# Patient Record
Sex: MALE | Race: OTHER | Employment: FULL TIME | URBAN - METROPOLITAN AREA
[De-identification: names, ages, dates, MRNs, and addresses within clinical notes are randomized per-mention and may not be internally consistent; named-entity substitution may affect disease eponyms.]

---

## 2024-03-15 ENCOUNTER — HOSPITAL ENCOUNTER (EMERGENCY)
Age: 34
Discharge: HOME | End: 2024-03-15
Attending: STUDENT IN AN ORGANIZED HEALTH CARE EDUCATION/TRAINING PROGRAM
Payer: OTHER GOVERNMENT

## 2024-03-15 ENCOUNTER — APPOINTMENT (OUTPATIENT)
Dept: GENERAL RADIOLOGY | Age: 34
End: 2024-03-15
Payer: OTHER GOVERNMENT

## 2024-03-15 VITALS
HEART RATE: 89 BPM | SYSTOLIC BLOOD PRESSURE: 119 MMHG | DIASTOLIC BLOOD PRESSURE: 87 MMHG | WEIGHT: 175 LBS | HEIGHT: 65 IN | OXYGEN SATURATION: 96 % | TEMPERATURE: 98.2 F | RESPIRATION RATE: 16 BRPM | BODY MASS INDEX: 29.16 KG/M2

## 2024-03-15 DIAGNOSIS — J40 BRONCHITIS: Primary | ICD-10-CM

## 2024-03-15 DIAGNOSIS — R09.81 NASAL CONGESTION: ICD-10-CM

## 2024-03-15 LAB
INFLUENZA A BY PCR: NOT DETECTED
INFLUENZA B BY PCR: NOT DETECTED
RSV BY PCR: NOT DETECTED
SARS-COV-2 RDRP RESP QL NAA+PROBE: NOT DETECTED
SPECIMEN DESCRIPTION: NORMAL
SPECIMEN SOURCE: NORMAL

## 2024-03-15 PROCEDURE — 87634 RSV DNA/RNA AMP PROBE: CPT

## 2024-03-15 PROCEDURE — 6370000000 HC RX 637 (ALT 250 FOR IP): Performed by: STUDENT IN AN ORGANIZED HEALTH CARE EDUCATION/TRAINING PROGRAM

## 2024-03-15 PROCEDURE — 87635 SARS-COV-2 COVID-19 AMP PRB: CPT

## 2024-03-15 PROCEDURE — 87502 INFLUENZA DNA AMP PROBE: CPT

## 2024-03-15 PROCEDURE — 99284 EMERGENCY DEPT VISIT MOD MDM: CPT

## 2024-03-15 PROCEDURE — 94640 AIRWAY INHALATION TREATMENT: CPT

## 2024-03-15 PROCEDURE — 6370000000 HC RX 637 (ALT 250 FOR IP)

## 2024-03-15 PROCEDURE — 71046 X-RAY EXAM CHEST 2 VIEWS: CPT

## 2024-03-15 RX ORDER — METHYLPREDNISOLONE SODIUM SUCCINATE 125 MG/2ML
125 INJECTION, POWDER, LYOPHILIZED, FOR SOLUTION INTRAMUSCULAR; INTRAVENOUS ONCE
Status: DISCONTINUED | OUTPATIENT
Start: 2024-03-15 | End: 2024-03-15

## 2024-03-15 RX ORDER — GUAIFENESIN 600 MG/1
600 TABLET, EXTENDED RELEASE ORAL 2 TIMES DAILY
Qty: 30 TABLET | Refills: 0 | Status: SHIPPED | OUTPATIENT
Start: 2024-03-15 | End: 2024-03-30

## 2024-03-15 RX ORDER — CYCLOBENZAPRINE HCL 10 MG
10 TABLET ORAL 3 TIMES DAILY PRN
Qty: 21 TABLET | Refills: 0 | Status: SHIPPED | OUTPATIENT
Start: 2024-03-15 | End: 2024-03-25

## 2024-03-15 RX ORDER — PSEUDOEPHEDRINE HCL 30 MG
30 TABLET ORAL ONCE
Status: COMPLETED | OUTPATIENT
Start: 2024-03-15 | End: 2024-03-15

## 2024-03-15 RX ORDER — FLUTICASONE PROPIONATE 50 MCG
2 SPRAY, SUSPENSION (ML) NASAL DAILY
Qty: 16 G | Refills: 0 | Status: SHIPPED | OUTPATIENT
Start: 2024-03-15

## 2024-03-15 RX ORDER — ACETAMINOPHEN 325 MG/1
650 TABLET ORAL ONCE
Status: COMPLETED | OUTPATIENT
Start: 2024-03-15 | End: 2024-03-15

## 2024-03-15 RX ORDER — ACETAMINOPHEN 325 MG/1
TABLET ORAL
Status: COMPLETED
Start: 2024-03-15 | End: 2024-03-15

## 2024-03-15 RX ORDER — IPRATROPIUM BROMIDE AND ALBUTEROL SULFATE 2.5; .5 MG/3ML; MG/3ML
2 SOLUTION RESPIRATORY (INHALATION) ONCE
Status: COMPLETED | OUTPATIENT
Start: 2024-03-15 | End: 2024-03-15

## 2024-03-15 RX ORDER — GUAIFENESIN 400 MG/1
400 TABLET ORAL ONCE
Status: COMPLETED | OUTPATIENT
Start: 2024-03-15 | End: 2024-03-15

## 2024-03-15 RX ADMIN — GUAIFENESIN 400 MG: 400 TABLET ORAL at 22:40

## 2024-03-15 RX ADMIN — ACETAMINOPHEN 650 MG: 325 TABLET ORAL at 21:44

## 2024-03-15 RX ADMIN — PSEUDOEPHEDRINE HCL 30 MG: 30 TABLET, FILM COATED ORAL at 22:40

## 2024-03-15 RX ADMIN — IPRATROPIUM BROMIDE AND ALBUTEROL SULFATE 2 DOSE: 2.5; .5 SOLUTION RESPIRATORY (INHALATION) at 21:23

## 2024-03-15 ASSESSMENT — PAIN DESCRIPTION - DESCRIPTORS: DESCRIPTORS: ACHING

## 2024-03-15 ASSESSMENT — LIFESTYLE VARIABLES
HOW OFTEN DO YOU HAVE A DRINK CONTAINING ALCOHOL: NEVER
HOW MANY STANDARD DRINKS CONTAINING ALCOHOL DO YOU HAVE ON A TYPICAL DAY: PATIENT DOES NOT DRINK

## 2024-03-15 ASSESSMENT — PAIN - FUNCTIONAL ASSESSMENT: PAIN_FUNCTIONAL_ASSESSMENT: NONE - DENIES PAIN

## 2024-03-15 ASSESSMENT — PAIN SCALES - GENERAL: PAINLEVEL_OUTOF10: 7

## 2024-03-15 ASSESSMENT — PAIN DESCRIPTION - LOCATION: LOCATION: HEAD

## 2024-03-15 NOTE — ED NOTES
Department of Emergency Medicine  FIRST PROVIDER TRIAGE NOTE             Independent MLP           3/15/24  5:48 PM EDT    Date of Encounter: 3/15/24   MRN: [id number]      HPI: Michael Casillas is a 33 y.o. male who presents to the ED for Shortness of Breath (For a few days.  ) and Allergies     SHORTNESS OF BREATH FOR A FEW DAYS.  HE SAID HE THINKS IT IS HIS SEASONAL ALLERGIES.   HAS HAD A COUGH AND \"BREATHING FAST\".     ROS: Negative for abd pain or back pain.    PE: Gen Appearance/Constitutional: alert  HEENT: NC/NT. PERRLA,  Airway patent.     Initial Plan of Care: All treatment areas with department are currently occupied. Plan to order/Initiate the following while awaiting opening in ED.  Initiate Treatment-Testing, Proceed toTreatment Area When Bed Available for ED Attending/MLP to Continue Care    Electronically signed by EAMON Mendez CNP   DD: 3/15/24      Matt Downing APRN - CNP  03/15/24 [id number]

## 2024-03-16 NOTE — ED PROVIDER NOTES
fever, headache, nausea, abdominal pain, constipation, diarrhea, urinary symptoms or any other active complaints.     Physical examination is unremarkable.  Patient is not in shortness of breath or any chest pain in the ED now.  Vitals are stable.  Patient is not hypoxic, not tachycardic.  Patient is saturating fine on room air.  Chest auscultation does sound mild wheezing throughout bilateral lungs.  My differentials include but is not limited to viral upper respiratory infection, pneumonia.  Patient has been given breathing treatment upon arrival to the ED.  He has also been given guanfacine and pseudoephedrine while in the ED.  On laboratory workup, viral panel is negative.  Chest x-ray looks clear.  On repeat evaluation, patient is feeling much better.  He is not in shortness of breath right now.  Patient will be discharged home with albuterol inhaler, prednisone burst.  Decision to discharge the patient has been made with shared decision making.    Vital signs upon arrival /87   Pulse 89   Temp 98.2 °F (36.8 °C)   Resp 16   Ht 1.651 m (5' 5\")   Wt 79.4 kg (175 lb)   SpO2 96%   BMI 29.12 kg/m²         Medications this visit include:   Orders Placed This Encounter   Medications    ipratropium 0.5 mg-albuterol 2.5 mg (DUONEB) nebulizer solution 2 Dose     Order Specific Question:   Initiate RT Bronchodilator Protocol     Answer:   No    DISCONTD: methylPREDNISolone sodium succ (SOLU-MEDROL) injection 125 mg    acetaminophen (TYLENOL) tablet 650 mg    acetaminophen (TYLENOL) 325 MG tablet     Inna Nolan M: cabinet override    fluticasone (FLONASE) 50 MCG/ACT nasal spray     Si sprays by Each Nostril route daily     Dispense:  16 g     Refill:  0    guaiFENesin (MUCINEX) 600 MG extended release tablet     Sig: Take 1 tablet by mouth 2 times daily for 15 days     Dispense:  30 tablet     Refill:  0    cyclobenzaprine (FLEXERIL) 10 MG tablet     Sig: Take 1 tablet by mouth 3 times daily as  improvement of his symptoms.  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  Which have been making his symptoms worse.  He denies any chest pain shortness of breath denies any fevers or chills otherwise no other acute complaints plan for symptomatic care discharge home follow-up with her family doctor [CB]   2148 Initial wet read as interpreted by myself chest X-ray: No pneumothorax, trachea midline, no acute infiltrate  See radiologist for final interpretation   [CB]   2157 On repeat evaluation, patient is feeling much better now.  Does not have any active complaint now in the ED. [NH]      ED Course User Index  [CB] Kiko Padilla MD  [NH] Lalo Albarran MD         This patient's ED course included: History, physical examination, reevaluation prior to disposition    This patient has {ED Patient's Course:20751::\"remained hemodynamically stable\"} during their ED course.    Counseling:   The emergency provider has spoken with the {Persons; family members:18714} and discussed today’s results, in addition to providing specific details for the plan of care and counseling regarding the diagnosis and prognosis.  Questions are answered at this time and they are agreeable with the plan.       --------------------------------- IMPRESSION AND DISPOSITION ---------------------------------    IMPRESSION  No diagnosis found.    DISPOSITION  Disposition: {Plan; disposition:13638}  Patient condition is {condition:74058}        NOTE: This report was transcribed using voice recognition software. Every effort was made to ensure accuracy; however, inadvertent computerized transcription errors may be present